# Patient Record
Sex: FEMALE | Race: BLACK OR AFRICAN AMERICAN | NOT HISPANIC OR LATINO | Employment: STUDENT | URBAN - METROPOLITAN AREA
[De-identification: names, ages, dates, MRNs, and addresses within clinical notes are randomized per-mention and may not be internally consistent; named-entity substitution may affect disease eponyms.]

---

## 2018-10-14 ENCOUNTER — HOSPITAL ENCOUNTER (EMERGENCY)
Facility: HOSPITAL | Age: 23
Discharge: HOME | End: 2018-10-14
Attending: EMERGENCY MEDICINE

## 2018-10-14 VITALS
WEIGHT: 120 LBS | HEART RATE: 57 BPM | BODY MASS INDEX: 22.66 KG/M2 | SYSTOLIC BLOOD PRESSURE: 124 MMHG | DIASTOLIC BLOOD PRESSURE: 75 MMHG | RESPIRATION RATE: 16 BRPM | TEMPERATURE: 98 F | OXYGEN SATURATION: 99 % | HEIGHT: 61 IN

## 2018-10-14 DIAGNOSIS — H57.89 CONTACT LENS STUCK: Primary | ICD-10-CM

## 2018-10-14 DIAGNOSIS — H18.212 CORNEAL EDEMA DUE TO CONTACT LENS, LEFT: ICD-10-CM

## 2018-10-14 PROCEDURE — 99284 EMERGENCY DEPT VISIT MOD MDM: CPT | Mod: ,,, | Performed by: EMERGENCY MEDICINE

## 2018-10-14 PROCEDURE — 99283 EMERGENCY DEPT VISIT LOW MDM: CPT

## 2018-10-14 RX ORDER — OFLOXACIN 3 MG/ML
1 SOLUTION/ DROPS OPHTHALMIC 4 TIMES DAILY
Qty: 1 BOTTLE | Refills: 0 | Status: SHIPPED | OUTPATIENT
Start: 2018-10-14 | End: 2018-10-19

## 2018-10-14 RX ORDER — ALBUTEROL SULFATE 0.63 MG/3ML
0.63 SOLUTION RESPIRATORY (INHALATION) EVERY 6 HOURS PRN
COMMUNITY

## 2018-10-14 NOTE — ED NOTES
Patient identifiers verified and correct for Kamilah Wesley.   LOC: The patient is awake, alert and aware of environment with an appropriate affect, the patient is oriented x 3 and speaking appropriately. Pt reports blurry vision in left eye after contact removal.   APPEARANCE: Patient appears comfortable and in no acute distress, patient is clean and well groomed.  SKIN: The skin is warm and dry, color consistent with ethnicity, patient has normal skin turgor and moist mucus membranes, skin intact, no breakdown or bruising noted.   MUSCULOSKELETAL: Patient moving all extremities spontaneously, no swelling noted.  RESPIRATORY: Airway is open and patent, respirations are spontaneous, patient has a normal effort and rate, no accessory muscle use noted, pt placed on continuous pulse ox with O2 sats noted at 97% on room air.  CARDIAC: Pt placed on cardiac monitor. Patient has a normal rate and regular rhythm, no edema noted, capillary refill < 3 seconds.   GASTRO: Soft and non tender to palpation, no distention noted, normoactive bowel sounds present in all four quadrants. Pt states bowel movements have been regular.  : Pt denies any pain or frequency with urination.  NEURO: Pt opens eyes spontaneously, behavior appropriate to situation, follows commands, facial expression symmetrical, bilateral hand grasp equal and even, purposeful motor response noted, normal sensation in all extremities when touched with a finger.

## 2018-10-14 NOTE — ED PROVIDER NOTES
Encounter Date: 10/14/2018    SCRIBE #1 NOTE: I, Lidia Kimbrough, bryan scribing for, and in the presence of,  Dr. Jara. I have scribed the following portions of the note - Other sections scribed: HPI, ROS, Disposition.       History     Chief Complaint   Patient presents with    Eye Problem     after taking out contact has cloudy vision in left eye. States there was a piece of the contact missing when she removed it      This is a 22 y.o. female who presents with complaint of cloudy/blurry vision in left eye x 1 hour ago. Patient reports having cloudy/blurry vision in left eye after removing monthly contacts and noticing a piece of the contact was missing. Patient states she fell asleep still wearing contacts and after waking up to remove them, a piece was missing from left eye contact. Patient did irrigate left eye PTA with no resolution to blurry vision. Patient denies any other concerning symptoms at this time. Patient does report removing contacts daily before bed and renewing contacts monthly. The patient denies any HA, vision loss, facial pain, eye pain, dizziness, lightheadedness, n/v/d, fevers or chills. No eye discharge.       The history is provided by the patient.     Review of patient's allergies indicates:   Allergen Reactions    Biaxin [clarithromycin] Anaphylaxis    Red dye Anaphylaxis    Strawberries [strawberry] Anaphylaxis     Past Medical History:   Diagnosis Date    Asthma      History reviewed. No pertinent surgical history.  History reviewed. No pertinent family history.  Social History     Tobacco Use    Smoking status: Never Smoker    Smokeless tobacco: Never Used   Substance Use Topics    Alcohol use: No    Drug use: Not on file     Review of Systems   Constitutional: Negative for activity change.   HENT: Negative for facial swelling.    Eyes: Positive for visual disturbance (cloudy/blurry vision (left eye contact particle)). Negative for pain and redness.   Respiratory:  Negative for shortness of breath.    Cardiovascular: Negative for chest pain.   Gastrointestinal: Negative for nausea.   Genitourinary: Negative for dysuria.   Musculoskeletal: Negative for back pain.   Skin: Negative for rash.   Neurological: Negative for headaches.   Hematological: Does not bruise/bleed easily.   All other systems reviewed and are negative.      Physical Exam     Initial Vitals [10/14/18 1732]   BP Pulse Resp Temp SpO2   124/75 (!) 57 16 97.6 °F (36.4 °C) 99 %      MAP       --         Physical Exam    Nursing note and vitals reviewed.       Gen/Constitutional: Interactive. No acute distress  Head: Normocephalic, Atraumatic  Neck: supple, no masses or LAD  Eyes: PERRLA, conjunctiva clear without erythema, no fb under eyelid eversion, there was a residual contact lens piece on cornea which was removed, mild corneal edema, no discharge, ronan's sign negative, no diplopia, no eye pain, EOMI  Ears, Nose and Throat: No rhinorrhea or stridor.  Cardiac: Reg Rhythm, No murmur  Pulmonary: CTA Bilat, no wheezes, rhonchi, rales.  GI: Abdomen soft, non-tender, non-distended; no rebound or guarding  : No CVA tenderness.  Musculoskeletal: Extremities warm, well perfused, no erythema, no edema  Skin: No rashes  Neuro: Alert and Oriented x 3; No focal motor or sensory deficits.    Psych: Normal affect      ED Course   Procedures  Labs Reviewed - No data to display       Imaging Results    None          Medical Decision Making:   Initial Assessment:   22 year old female previously healthy presenting with contact lens stuck and blurry vision.  Differential Diagnosis:   Differential diagnosis includes but is not limited to: contact lens retained, corneal abrasin, corneal edema, foreign body, conjunctivitis    Well appearing female in NAD. Exam with small retained contact lens piec. Ete was throughly irrigated and inspected with no further residual contact lens or foreign body. Vision intact with corrective  lenses. No fb sensation. Eyelid was inverted and inspected without retained fb. Mohit's test neg. No corneal abrasion on fluoroscein eval. Slight corneal edema at area of adhered contact lens. Patient given Rx for fluoroquinolone eye drops for 5 days and f/u wioth ophthalmology if symptoms return. NO high risk features to include discharge, eye pain, vision loss, headaches, fever or chills.   Patient agreeable to discharge plan. Strict ED precautions and return instructions discussed at length and patient verbalized understanding. All questions were answered and ample time was given for questions.                          Clinical Impression:     1. Contact lens stuck    2. Corneal edema due to contact lens, left        Disposition:   Disposition: Discharged  Condition: Stable      Antione Jara DO  Dept of Emergency Medicine   [facility]  Spectralink: [id number]                      Antione Jara DO  10/16/18 2252

## 2018-10-14 NOTE — ED TRIAGE NOTES
+ Eye Problem (after taking out contact has cloudyb vision in left eye. States there was a piece of the contact missing when she removed it )

## 2020-06-15 ENCOUNTER — LAB VISIT (OUTPATIENT)
Dept: LAB | Facility: OTHER | Age: 25
End: 2020-06-15
Attending: NURSE PRACTITIONER
Payer: COMMERCIAL

## 2020-06-15 ENCOUNTER — OFFICE VISIT (OUTPATIENT)
Dept: OBSTETRICS AND GYNECOLOGY | Facility: CLINIC | Age: 25
End: 2020-06-15
Payer: COMMERCIAL

## 2020-06-15 VITALS
DIASTOLIC BLOOD PRESSURE: 68 MMHG | BODY MASS INDEX: 24.41 KG/M2 | WEIGHT: 129.19 LBS | SYSTOLIC BLOOD PRESSURE: 122 MMHG

## 2020-06-15 DIAGNOSIS — Z71.85 HPV VACCINE COUNSELING: ICD-10-CM

## 2020-06-15 DIAGNOSIS — Z11.3 ROUTINE SCREENING FOR STI (SEXUALLY TRANSMITTED INFECTION): ICD-10-CM

## 2020-06-15 DIAGNOSIS — Z12.4 PAP SMEAR FOR CERVICAL CANCER SCREENING: ICD-10-CM

## 2020-06-15 DIAGNOSIS — Z80.3 FAMILY HISTORY OF BREAST CANCER IN FIRST DEGREE RELATIVE: ICD-10-CM

## 2020-06-15 DIAGNOSIS — Z30.41 ORAL CONTRACEPTIVE PILL SURVEILLANCE: ICD-10-CM

## 2020-06-15 DIAGNOSIS — Z01.419 WELL WOMAN EXAM WITH ROUTINE GYNECOLOGICAL EXAM: Primary | ICD-10-CM

## 2020-06-15 DIAGNOSIS — Z01.419 WELL WOMAN EXAM WITH ROUTINE GYNECOLOGICAL EXAM: ICD-10-CM

## 2020-06-15 PROBLEM — Z23 NEED FOR HPV VACCINATION: Status: ACTIVE | Noted: 2020-06-15

## 2020-06-15 PROCEDURE — 99385 PR PREVENTIVE VISIT,NEW,18-39: ICD-10-PCS | Mod: S$GLB,,, | Performed by: NURSE PRACTITIONER

## 2020-06-15 PROCEDURE — 86592 SYPHILIS TEST NON-TREP QUAL: CPT

## 2020-06-15 PROCEDURE — 36415 COLL VENOUS BLD VENIPUNCTURE: CPT

## 2020-06-15 PROCEDURE — 80074 ACUTE HEPATITIS PANEL: CPT

## 2020-06-15 PROCEDURE — 99999 PR PBB SHADOW E&M-EST. PATIENT-LVL III: CPT | Mod: PBBFAC,,, | Performed by: NURSE PRACTITIONER

## 2020-06-15 PROCEDURE — 87801 DETECT AGNT MULT DNA AMPLI: CPT

## 2020-06-15 PROCEDURE — 87661 TRICHOMONAS VAGINALIS AMPLIF: CPT

## 2020-06-15 PROCEDURE — 88175 CYTOPATH C/V AUTO FLUID REDO: CPT

## 2020-06-15 PROCEDURE — 87481 CANDIDA DNA AMP PROBE: CPT | Mod: 59

## 2020-06-15 PROCEDURE — 87491 CHLMYD TRACH DNA AMP PROBE: CPT

## 2020-06-15 PROCEDURE — 86703 HIV-1/HIV-2 1 RESULT ANTBDY: CPT

## 2020-06-15 PROCEDURE — 99999 PR PBB SHADOW E&M-EST. PATIENT-LVL III: ICD-10-PCS | Mod: PBBFAC,,, | Performed by: NURSE PRACTITIONER

## 2020-06-15 PROCEDURE — 99385 PREV VISIT NEW AGE 18-39: CPT | Mod: S$GLB,,, | Performed by: NURSE PRACTITIONER

## 2020-06-15 RX ORDER — DROSPIRENONE AND ETHINYL ESTRADIOL 0.02-3(28)
0.02 KIT ORAL
COMMUNITY
Start: 2020-05-25

## 2020-06-15 NOTE — PROGRESS NOTES
CC: Annual  HPI: Pt is a 24 y.o.  female who presents for routine annual exam. New patient to me, here to establish care. She has never had a pelvic exam before. She uses OCPs for contraception. She is SA with one partner. She does want STD screening-including blood work. Started OCPs years ago for acne control, did Nexplanon in the past. Strong family history of breast cancer (see below). She was BRCA negative. Moving to [place] in one month for [employer] school. Interested in getting first HPV vaccine prior to moving.    FH:   Breast cancer: mother- dx in her 40s; sister-dx in her 30s; maternal grandmother-age of dx unknown  Colon cancer: none  Ovarian cancer: none  Cervical cancer-mother    Flu vaccine: na  HPV vaccine: no  Last pap smear: never had one  Colonoscopy: na  DEXA: na  Mammogram: na  STD history: none  Birth control: OCPs    ROS:  GENERAL: Feeling well overall. Denies fever or chills.   SKIN: Denies rash or lesions.   HEAD: Denies head injury or headache.   NODES: Denies enlarged lymph nodes.   CHEST: Denies chest pain or shortness of breath.   CARDIOVASCULAR: Denies palpitations or left sided chest pain.   ABDOMEN: No abdominal pain, constipation, diarrhea, nausea, vomiting or rectal bleeding.   URINARY: No dysuria, hematuria, or burning on urination.  REPRODUCTIVE: See HPI.   BREASTS: Denies pain, lumps, or nipple discharge.   HEMATOLOGIC: No easy bruisability or excessive bleeding.   MUSCULOSKELETAL: Denies joint pain or swelling.   NEUROLOGIC: Denies syncope or weakness.   PSYCHIATRIC: Denies depression, anxiety or mood swings.    PE:   APPEARANCE: Well nourished, well developed, Black or  female in no acute distress.  NODES: no cervical, supraclavicular, or inguinal lymphadenopathy  BREASTS: Symmetrical, no skin changes or visible lesions. No palpable masses, nipple discharge or adenopathy bilaterally.  ABDOMEN: Soft. No tenderness or masses. No distention. No hernias palpated. No CVA  tenderness.  VULVA: No lesions. Normal external female genitalia.  URETHRAL MEATUS: Normal size and location, no lesions, no prolapse.  URETHRA: No masses, tenderness, or prolapse.  VAGINA: Moist. No lesions or lacerations noted. No abnormal discharge present. No odor present.   CERVIX: No lesions or discharge. No cervical motion tenderness.   UTERUS: Normal size, regular shape, mobile, non-tender.  ADNEXA: No tenderness. No fullness or masses palpated in the adnexal regions.   ANUS PERINEUM: Normal.      Diagnosis:  1. Well woman exam with routine gynecological exam    2. Pap smear for cervical cancer screening    3. Oral contraceptive pill surveillance    4. Routine screening for STI (sexually transmitted infection)    5. HPV vaccine counseling        Plan:     Orders Placed This Encounter    C. trachomatis/N. gonorrhoeae by AMP DNA    Vaginosis Screen by DNA Probe    HIV 1/2 Ag/Ab (4th Gen)    RPR    Hepatitis Panel, Acute    Liquid-Based Pap Smear, Screening     1. Pap done  2. HPV counseling-pt will contact me when ready to schedule vaccine  3. Full sti panel  4. Continue OCPs-does not need refill today    Patient was counseled today on the new ACS guidelines for cervical cytology screening as well as the current recommendations for breast cancer screening. She was counseled to follow up with her PCP for other routine health maintenance. Counseling session lasted approximately 10 minutes, and all her questions were answered.    Follow-up with me in 1 year for routine exam; pap in 3 years.

## 2020-06-16 LAB
BACTERIAL VAGINOSIS DNA: NEGATIVE
CANDIDA GLABRATA DNA: NEGATIVE
CANDIDA KRUSEI DNA: NEGATIVE
CANDIDA RRNA VAG QL PROBE: POSITIVE
HAV IGM SERPL QL IA: NEGATIVE
HBV CORE IGM SERPL QL IA: NEGATIVE
HBV SURFACE AG SERPL QL IA: NEGATIVE
HCV AB SERPL QL IA: NEGATIVE
HIV 1+2 AB+HIV1 P24 AG SERPL QL IA: NEGATIVE
RPR SER QL: NORMAL
T VAGINALIS RRNA GENITAL QL PROBE: NEGATIVE

## 2020-06-17 LAB
C TRACH DNA SPEC QL NAA+PROBE: NOT DETECTED
N GONORRHOEA DNA SPEC QL NAA+PROBE: NOT DETECTED

## 2020-06-17 RX ORDER — TERCONAZOLE 4 MG/G
1 CREAM VAGINAL NIGHTLY
Qty: 1 G | Refills: 0 | Status: SHIPPED | OUTPATIENT
Start: 2020-06-17 | End: 2020-06-24

## 2020-06-19 LAB
FINAL PATHOLOGIC DIAGNOSIS: NORMAL
Lab: NORMAL

## 2024-08-06 ENCOUNTER — EXTERNAL RECORD (OUTPATIENT)
Dept: HEALTH INFORMATION MANAGEMENT | Facility: OTHER | Age: 29
End: 2024-08-06

## 2024-08-07 ENCOUNTER — OFF PREMISE (OUTPATIENT)
Dept: UROGYNECOLOGY | Age: 29
End: 2024-08-07